# Patient Record
Sex: MALE | Race: WHITE | Employment: FULL TIME | URBAN - METROPOLITAN AREA
[De-identification: names, ages, dates, MRNs, and addresses within clinical notes are randomized per-mention and may not be internally consistent; named-entity substitution may affect disease eponyms.]

---

## 2019-07-11 PROCEDURE — 93010 ELECTROCARDIOGRAM REPORT: CPT | Performed by: INTERNAL MEDICINE

## 2022-12-11 ENCOUNTER — HOSPITAL ENCOUNTER (OUTPATIENT)
Age: 62
Discharge: HOME | End: 2022-12-11
Attending: EMERGENCY MEDICINE
Payer: COMMERCIAL

## 2022-12-11 VITALS
DIASTOLIC BLOOD PRESSURE: 102 MMHG | OXYGEN SATURATION: 99 % | TEMPERATURE: 98 F | HEART RATE: 70 BPM | SYSTOLIC BLOOD PRESSURE: 185 MMHG | RESPIRATION RATE: 20 BRPM

## 2022-12-11 DIAGNOSIS — J01.00 ACUTE NON-RECURRENT MAXILLARY SINUSITIS: Primary | ICD-10-CM

## 2022-12-11 PROCEDURE — 99203 OFFICE O/P NEW LOW 30 MIN: CPT

## 2022-12-11 RX ORDER — AMOXICILLIN AND CLAVULANATE POTASSIUM 875; 125 MG/1; MG/1
1 TABLET, FILM COATED ORAL 2 TIMES DAILY
Qty: 14 TABLET | Refills: 0 | Status: SHIPPED | OUTPATIENT
Start: 2022-12-11 | End: 2022-12-18

## 2022-12-11 NOTE — ED INITIAL ASSESSMENT (HPI)
Pt c/o sinus pain/pressure, nasal congestion, post-nasal drip x 1 week, sore throat and bilateral eye redness and discharge x 2 days.  Negative at home covid test. No fever

## 2025-02-04 ENCOUNTER — HOSPITAL ENCOUNTER (OUTPATIENT)
Age: 65
Discharge: HOME | End: 2025-02-04
Payer: COMMERCIAL

## 2025-02-04 VITALS
OXYGEN SATURATION: 99 % | DIASTOLIC BLOOD PRESSURE: 104 MMHG | RESPIRATION RATE: 17 BRPM | HEART RATE: 70 BPM | SYSTOLIC BLOOD PRESSURE: 168 MMHG | TEMPERATURE: 98 F

## 2025-02-04 DIAGNOSIS — J01.90 ACUTE SINUSITIS, RECURRENCE NOT SPECIFIED, UNSPECIFIED LOCATION: Primary | ICD-10-CM

## 2025-02-04 PROCEDURE — 99213 OFFICE O/P EST LOW 20 MIN: CPT

## 2025-02-04 RX ORDER — VALSARTAN 80 MG/1
80 TABLET ORAL 2 TIMES DAILY
COMMUNITY

## 2025-02-04 RX ORDER — ATORVASTATIN CALCIUM 10 MG/1
1 TABLET, FILM COATED ORAL
COMMUNITY
Start: 2024-07-31 | End: 2025-07-02

## 2025-02-04 NOTE — ED PROVIDER NOTES
Patient Seen in: [facility] [place]      History     Chief Complaint   Patient presents with    Cough/URI     Stated Complaint: URI, sinus infection    Subjective:   HPI    64-year-old male presents with concern for sinus infection.  Patient states he had an upper respiratory infection that has lasted 2 weeks.  He states he is feeling mildly better but now is left with pain in his face.  Patient did a home COVID test that was negative.  He is not concern for influenza.      Objective:     Past Medical History:    Arrhythmia    hx svt ablation 4 years ago    Esophageal reflux    SVT (supraventricular tachycardia)              Past Surgical History:   Procedure Laterality Date    Colonoscopy N/A 2/11/2022    Procedure: Colonoscopy;  Surgeon: Se Guido MD;  Location: [facility]    Colonoscopy,diagnostic  12/30/2016    ascending/splenic flexure/sigmoid polyps-adenomas/hyperplastic, divertiuculosis    Colonoscopy,diagnostic  02/11/2022    diverticulosis    Other surgical history      LASIK                No pertinent social history.            Review of Systems    Positive for stated complaint: URI, sinus infection  Other systems are as noted in HPI.  Constitutional and vital signs reviewed.      All other systems reviewed and negative except as noted above.    Physical Exam     ED Triage Vitals   BP 02/04/25 1540 (!) 168/104   Pulse 02/04/25 1535 70   Resp 02/04/25 1535 17   Temp 02/04/25 1535 98.1 °F (36.7 °C)   Temp src 02/04/25 1535 Oral   SpO2 02/04/25 1535 99 %   O2 Device 02/04/25 1535 None (Room air)       Current Vitals:   Vital Signs  BP: (!) 168/104  Pulse: 70  Resp: 17  Temp: 98.1 °F (36.7 °C)  Temp src: Oral    Oxygen Therapy  SpO2: 99 %  O2 Device: None (Room air)        Physical Exam  Vitals reviewed.   Constitutional:       General: He is not in acute distress.     Appearance: He is not ill-appearing.   HENT:      Right Ear: Tympanic membrane, ear canal and external ear  normal.      Left Ear: Tympanic membrane, ear canal and external ear normal.      Nose:      Comments: Tenderness to percuss over bilteral maxillary sinuses     Mouth/Throat:      Mouth: Mucous membranes are moist.   Cardiovascular:      Rate and Rhythm: Normal rate and regular rhythm.   Pulmonary:      Effort: Pulmonary effort is normal.      Breath sounds: Normal breath sounds.   Skin:     General: Skin is warm and dry.   Neurological:      General: No focal deficit present.      Mental Status: He is alert and oriented to person, place, and time.   Psychiatric:         Mood and Affect: Mood normal.         Behavior: Behavior normal.             ED Course   Labs Reviewed - No data to display                MDM              Medical Decision Making  64-year-old male presents with concern for sinus infection.  Differential diagnosis includes viral upper respiratory infection versus bacterial sinus infection.  Patient has been sick with upper respiratory symptoms for 2 weeks.  He states most of the symptoms have resolved except for pain in his face.  He remains congested.  Patient did home COVID test that was negative.  He is not concern for influenza.  Will treat patient for a sinus infection given the length of symptoms.  Prescription for Augmentin was sent to his pharmacy.  He was given printed instructions for care.    Risk  OTC drugs.  Prescription drug management.        Disposition and Plan     Clinical Impression:  1. Acute sinusitis, recurrence not specified, unspecified location         Disposition:  Discharge  2/4/2025  3:53 pm    Follow-up:  No follow-up provider specified.        Medications Prescribed:  Discharge Medication List as of 2/4/2025  3:53 PM        START taking these medications    Details   amoxicillin clavulanate 875-125 MG Oral Tab Take 1 tablet by mouth 2 (two) times daily for 10 days., Normal, Disp-20 tablet, R-0                 Supplementary Documentation:

## 2025-07-23 ENCOUNTER — HOSPITAL ENCOUNTER (OUTPATIENT)
Age: 65
Discharge: HOME | End: 2025-07-23
Attending: EMERGENCY MEDICINE
Payer: COMMERCIAL

## 2025-07-23 VITALS
HEART RATE: 80 BPM | SYSTOLIC BLOOD PRESSURE: 154 MMHG | OXYGEN SATURATION: 93 % | DIASTOLIC BLOOD PRESSURE: 96 MMHG | RESPIRATION RATE: 18 BRPM | TEMPERATURE: 98 F

## 2025-07-23 DIAGNOSIS — L25.9 CONTACT DERMATITIS, UNSPECIFIED CONTACT DERMATITIS TYPE, UNSPECIFIED TRIGGER: Primary | ICD-10-CM

## 2025-07-23 PROCEDURE — 99213 OFFICE O/P EST LOW 20 MIN: CPT

## 2025-07-23 RX ORDER — PREDNISONE 20 MG/1
TABLET ORAL
Qty: 18 TABLET | Refills: 0 | Status: SHIPPED | OUTPATIENT
Start: 2025-07-23

## 2025-07-23 NOTE — ED INITIAL ASSESSMENT (HPI)
Developed rash from poison ivy/oak yesterday after doing yard work. Rash to both arms, face, and back. + itching.

## 2025-07-23 NOTE — ED PROVIDER NOTES
Patient Seen in: [facility] [place]        History  Chief Complaint   Patient presents with    Rash Skin Problem     Stated Complaint: allergic reaction poison ivy    Subjective:   HPI    The patient is a 65-year-old male with past history of SVT, status post ablation who presents now with diffuse itchy rash.  The patient states he was doing some yard work on Sunday and believes he was exposed to a contact allergen.  The patient states he has had skin sensitivities throughout his life.  The patient denies any shortness of breath.  The patient denies any throat symptoms.  Patient states that rash is itchy.      Objective:     Past Medical History:    Arrhythmia    hx svt ablation 4 years ago    Esophageal reflux    SVT (supraventricular tachycardia)              Past Surgical History:   Procedure Laterality Date    Colonoscopy N/A 2/11/2022    Procedure: Colonoscopy;  Surgeon: Se Guido MD;  Location: [facility]    Colonoscopy,diagnostic  12/30/2016    ascending/splenic flexure/sigmoid polyps-adenomas/hyperplastic, divertiuculosis    Colonoscopy,diagnostic  02/11/2022    diverticulosis    Other surgical history      LASIK                Social History     Socioeconomic History    Marital status:    Tobacco Use    Smoking status: Never    Smokeless tobacco: Never   Substance and Sexual Activity    Alcohol use: Yes     Alcohol/week: 6.0 standard drinks of alcohol     Types: 6 Standard drinks or equivalent per week     Comment: day-week    Drug use: Never     Social Drivers of Health     Food Insecurity: Low Risk  (7/7/2025)    Received from [facility]    Food Insecurity     Have there been times that your food ran out, and you didn't have money to get more?: No     Are there times that you worry that this might happen?: No   Transportation Needs: Low Risk  (7/7/2025)    Received from [facility]    Transportation Needs     Do you have  trouble getting transportation to medical appointments?: No              Review of Systems    Positive for stated complaint: allergic reaction poison ivy  Other systems are as noted in HPI.  Constitutional and vital signs reviewed.      All other systems reviewed and negative except as noted above.                  Physical Exam    ED Triage Vitals [07/23/25 1739]   BP (!) 154/96   Pulse 80   Resp 18   Temp 97.9 °F (36.6 °C)   Temp src Oral   SpO2 93 %   O2 Device None (Room air)       Current Vitals:   Vital Signs  BP: (!) 154/96  Pulse: 80  Resp: 18  Temp: 97.9 °F (36.6 °C)  Temp src: Oral    Oxygen Therapy  SpO2: 93 %  O2 Device: None (Room air)            Physical Exam    Constitutional: Well-developed well-nourished in no acute distress  Head: Normocephalic, no swelling or tenderness  Eyes: Nonicteric sclera, no conjunctival injection  ENT: TMs are clear and flat bilaterally.  There is no posterior pharyngeal erythema  Chest: Clear to auscultation, no tenderness  Cardiovascular: Regular rate and rhythm without murmur  Abdomen: Soft, nontender and nondistended  Neurologic: Patient is awake, alert and oriented ×3.  The patient's motor strength is 5 out of 5 and symmetric in the upper and lower extremities bilaterally  Extremities: No focal swelling or tenderness  Skin: Diffuse erythematous raised rash, most pronounced on the forearms and lower back areas.          ED Course  Labs Reviewed - No data to display     Will initiate oral prednisone.  Recommend antihistamines for the itching as well.                    MDM     Contact dermatitis versus poison ivy/oak        Medical Decision Making      Disposition and Plan     Clinical Impression:  1. Contact dermatitis, unspecified contact dermatitis type, unspecified trigger         Disposition:  Discharge  7/23/2025  5:49 pm    Follow-up:  Brigitte Tse MD  [address] [place] [address]  [address] [place] [address]  [phone]      Or your primary MD as  needed          Medications Prescribed:  Current Discharge Medication List        START taking these medications    Details   predniSONE 20 MG Oral Tab Take 3 tablets daily x 3 days then 2 tablets daily x 3 days then 1 tablet daily x 3 days  Qty: 18 tablet, Refills: 0                   Supplementary Documentation: